# Patient Record
Sex: MALE | Race: WHITE | NOT HISPANIC OR LATINO | URBAN - METROPOLITAN AREA
[De-identification: names, ages, dates, MRNs, and addresses within clinical notes are randomized per-mention and may not be internally consistent; named-entity substitution may affect disease eponyms.]

---

## 2020-11-10 ENCOUNTER — OFFICE VISIT (OUTPATIENT)
Dept: INFECTIOUS DISEASES | Facility: MEDICAL CENTER | Age: 50
End: 2020-11-10
Payer: MEDICAID

## 2020-11-10 ENCOUNTER — HOSPITAL ENCOUNTER (OUTPATIENT)
Dept: LAB | Facility: MEDICAL CENTER | Age: 50
End: 2020-11-10
Attending: INTERNAL MEDICINE
Payer: MEDICAID

## 2020-11-10 VITALS
DIASTOLIC BLOOD PRESSURE: 60 MMHG | SYSTOLIC BLOOD PRESSURE: 90 MMHG | BODY MASS INDEX: 29.92 KG/M2 | RESPIRATION RATE: 16 BRPM | OXYGEN SATURATION: 95 % | WEIGHT: 202 LBS | HEIGHT: 69 IN | HEART RATE: 80 BPM | TEMPERATURE: 97.9 F

## 2020-11-10 DIAGNOSIS — B18.2 CHRONIC HEPATITIS C WITHOUT HEPATIC COMA (HCC): ICD-10-CM

## 2020-11-10 LAB
ALBUMIN SERPL BCP-MCNC: 4.7 G/DL (ref 3.2–4.9)
ALBUMIN/GLOB SERPL: 1.3 G/DL
ALP SERPL-CCNC: 87 U/L (ref 30–99)
ALT SERPL-CCNC: 215 U/L (ref 2–50)
ANION GAP SERPL CALC-SCNC: 12 MMOL/L (ref 7–16)
AST SERPL-CCNC: 95 U/L (ref 12–45)
BILIRUB SERPL-MCNC: 0.2 MG/DL (ref 0.1–1.5)
BUN SERPL-MCNC: 13 MG/DL (ref 8–22)
CALCIUM SERPL-MCNC: 10.6 MG/DL (ref 8.5–10.5)
CHLORIDE SERPL-SCNC: 102 MMOL/L (ref 96–112)
CO2 SERPL-SCNC: 27 MMOL/L (ref 20–33)
CREAT SERPL-MCNC: 0.72 MG/DL (ref 0.5–1.4)
GLOBULIN SER CALC-MCNC: 3.6 G/DL (ref 1.9–3.5)
GLUCOSE SERPL-MCNC: 86 MG/DL (ref 65–99)
HBV CORE AB SERPL QL IA: REACTIVE
HBV SURFACE AB SERPL IA-ACNC: 207 MIU/ML (ref 0–10)
HBV SURFACE AG SER QL: NORMAL
HIV 1+2 AB+HIV1 P24 AG SERPL QL IA: NORMAL
POTASSIUM SERPL-SCNC: 4.4 MMOL/L (ref 3.6–5.5)
PROT SERPL-MCNC: 8.3 G/DL (ref 6–8.2)
SODIUM SERPL-SCNC: 141 MMOL/L (ref 135–145)

## 2020-11-10 PROCEDURE — 87902 NFCT AGT GNTYP ALYS HEP C: CPT

## 2020-11-10 PROCEDURE — 86706 HEP B SURFACE ANTIBODY: CPT

## 2020-11-10 PROCEDURE — 86708 HEPATITIS A ANTIBODY: CPT

## 2020-11-10 PROCEDURE — 87389 HIV-1 AG W/HIV-1&-2 AB AG IA: CPT

## 2020-11-10 PROCEDURE — 36415 COLL VENOUS BLD VENIPUNCTURE: CPT

## 2020-11-10 PROCEDURE — 80053 COMPREHEN METABOLIC PANEL: CPT

## 2020-11-10 PROCEDURE — 87340 HEPATITIS B SURFACE AG IA: CPT

## 2020-11-10 PROCEDURE — 87522 HEPATITIS C REVRS TRNSCRPJ: CPT

## 2020-11-10 PROCEDURE — 86704 HEP B CORE ANTIBODY TOTAL: CPT

## 2020-11-10 PROCEDURE — 99203 OFFICE O/P NEW LOW 30 MIN: CPT | Performed by: INTERNAL MEDICINE

## 2020-11-10 RX ORDER — DICLOFENAC POTASSIUM 50 MG/1
50 TABLET, FILM COATED ORAL
COMMUNITY
Start: 2020-09-14 | End: 2021-10-13

## 2020-11-10 RX ORDER — IBUPROFEN 200 MG
200 TABLET ORAL EVERY 6 HOURS PRN
COMMUNITY

## 2020-11-10 NOTE — PROGRESS NOTES
Date of Service:  11/10/2020    Reason for Consult: Hepatitis C    Referring Physician: Mica Tapia MD    HPI:   The patient is a 50-year-old male with past medical history of acid reflux/GERD, IV drug use, chronic joint pain and tobacco use.  He states that he last used drugs of any kind over a year ago and continues as part of a recovery program.  He endorses smoking 1/2 pack of cigarettes per day.  He is unsure of when he was diagnosed with hepatitis C potentially before 2005.  He is never been treated for hepatitis C.  He now has a new primary care physician and as part of work-up and screening the hepatitis C was again noted and he was referred for therapy.  I have reviewed labs and hepatitis C NAAT is positive indicating chronic hepatitis C.     He is feeling well overall today.  He has some ongoing fatigue but also states he works 3 jobs.  He also has chronic joint pain particular his hands but states this is unchanged.  He denies any headaches or vision changes.  No cough or shortness of breath.  No abdominal pain, nausea or diarrhea.  No skin lesions or joint effusions.    ROS: All other ROS were reviewed and are negative except as noted above in the HPI.     No past medical history on file.   See HPI    No past surgical history on file.   See HPI    No pertinent family history    Social History     Socioeconomic History   • Marital status: Legally      Spouse name: Not on file   • Number of children: Not on file   • Years of education: Not on file   • Highest education level: Not on file   Occupational History   • Not on file   Social Needs   • Financial resource strain: Not on file   • Food insecurity     Worry: Not on file     Inability: Not on file   • Transportation needs     Medical: Not on file     Non-medical: Not on file   Tobacco Use   • Smoking status: Not on file   Substance and Sexual Activity   • Alcohol use: Not on file   • Drug use: Not on file   • Sexual activity: Not on file  "  Lifestyle   • Physical activity     Days per week: Not on file     Minutes per session: Not on file   • Stress: Not on file   Relationships   • Social connections     Talks on phone: Not on file     Gets together: Not on file     Attends Mandaeism service: Not on file     Active member of club or organization: Not on file     Attends meetings of clubs or organizations: Not on file     Relationship status: Not on file   • Intimate partner violence     Fear of current or ex partner: Not on file     Emotionally abused: Not on file     Physically abused: Not on file     Forced sexual activity: Not on file   Other Topics Concern   • Not on file   Social History Narrative   • Not on file     Social History     Tobacco Use   Smoking Status Not on file     Social History     Substance and Sexual Activity   Alcohol Use Not on file     No Known Allergies      Other Current Medications:  Diclofenac    History reviewed with the patient       Most Recent Vital Signs:  BP (!) 90/60 (BP Location: Left arm, Patient Position: Sitting, BP Cuff Size: Adult)   Pulse 80   Temp 36.6 °C (97.9 °F) (Temporal)   Resp 16   Ht 1.74 m (5' 8.5\") Comment: patient reported  Wt 91.6 kg (202 lb)   SpO2 95%   BMI 30.27 kg/m²   Physical Exam:  General: well-appearing, in no acute distress  HEENT: sclera anicteric, MMM, no lymphadenopathy, no oral lesions  Neck: supple, no LAD  Chest: CTAB, normal work of breathing, no rubs/rales/wheezing  Cardiac: RRR, normal S1 S2, no m/r/g   Abdomen: +bowel sounds, soft, non-tender, non-distended  Extremities: no edema, WWP  Skin: no rashes or worrisome lesions, tattoos  Neuro: Alert and oriented to person, place and time, non-focal, moves all extremities    Pertinent Lab Results:  Reviewed labs under media tab    Microbiology:    Studies:      Impression/Plan:     1. Chronic hepatitis C without hepatic coma (HCC)  HCV RNA BY PCR QN FLX WADE    HIV AG/AB COMBO ASSAY SCREENING    Comp Metabolic Panel    " Prothrombin Time    HEP B SURFACE ANTIGEN    HEP B CORE AB TOTAL    HEP B SURFACE AB    HEP A AB TOTAL    REFERRAL TO PHARMACOTHERAPY SERVICE         PLAN:       Chronic hepatitis C    --- Last labs obtained approximately 5 months ago.  Ordered repeat labs as noted above.   --- Once labs have returned including assessment for fibrosis then can assess whether ultrasound would be recommended.  If the patient has fibrosis/cirrhosis then will recommend ultrasound prior to initiating therapy to ensure no suspicious lesions or malignancy.   --- Placed referral for pharmacotherapy-as per agreement with pharmacy they will contact the patient for a visit and then prescribe appropriate hepatitis C therapeutic in conjunction with ID.  Discussed this with the patient and he is aware and agreeable.  --- Anticipate on 8-12-week course      Vaccines   All HCV patients should be vaccinated for or have immunity (see labs above) for HAV, HBV, Pneumonia (PCV13 -> at least 8 weeks -> PPSV23), Tdap, influenza     Follow-up  --- Recommend call or office visit with pharmacy and repeat labs  4 weeks from start of therapy - to assess for tolerance and/or side effects related to treatment.  If any concerning symptoms such as fever, jaundice, scleral icterus, abdominal pain, abdominal distention, nausea, vomiting or diarrhea or other then  obtain eGFR, creatinine, hepatic function panel (note: A 10x increase in ALT at any time should prompt immediate DC of therapy, if ALT rising then test q2 weeks) and notify [facility].  At any time during therapy if there are concerning signs/symptoms then obtain the above labs and notify [facility].  If NO concerns then continue treatment and no additional labs are needed until after treatment is complete.   --- 12 weeks after the last day of  therapy - obtain an Hepatitis C virus (HCV) quantitative, real-time PCR this will establish if the patient is cured   --- Any patient  with cirrhosis so will need liver US or other dedicated imaging every 6 months     Karen Flynn M.D.    11/10/2020

## 2020-11-11 DIAGNOSIS — B18.2 CHRONIC HEPATITIS C WITHOUT HEPATIC COMA (HCC): ICD-10-CM

## 2020-11-12 LAB
HAV AB SER QL IA: NEGATIVE
HCV RNA SERPL NAA+PROBE-ACNC: ABNORMAL K[IU]/ML
HCV RNA SERPL NAA+PROBE-LOG IU: 4.75 LOG IU/ML
HCV RNA SERPL QL NAA+PROBE: DETECTED

## 2020-11-14 LAB — HCV GENTYP SERPL NAA+PROBE: NORMAL

## 2020-12-03 DIAGNOSIS — B18.2 CHRONIC HEPATITIS C WITHOUT HEPATIC COMA (HCC): ICD-10-CM

## 2020-12-14 ENCOUNTER — TELEPHONE (OUTPATIENT)
Dept: VASCULAR LAB | Facility: MEDICAL CENTER | Age: 50
End: 2020-12-14

## 2020-12-14 NOTE — TELEPHONE ENCOUNTER
Attempted to reach pt regarding referral to hepatitis infectious disease. Phone rang with no VM set up, will attempt to reach at later time.          Jasmyn Seals, TimothyD

## 2021-01-25 ENCOUNTER — TELEPHONE (OUTPATIENT)
Dept: VASCULAR LAB | Facility: MEDICAL CENTER | Age: 51
End: 2021-01-25

## 2021-01-26 NOTE — TELEPHONE ENCOUNTER
Spoke to patient via telephone about setting up an appointment for Hep C treatment. Stated he was interested in setting up an appointment but was wondering if he could do a virtual visit since he lives over two hours away and would need to borrow a friend's vehicle. Reported I would call him back tomorrow to follow up if we could see in via virtual visit.

## 2021-01-28 ENCOUNTER — TELEPHONE (OUTPATIENT)
Dept: VASCULAR LAB | Facility: MEDICAL CENTER | Age: 51
End: 2021-01-28

## 2021-01-29 NOTE — TELEPHONE ENCOUNTER
Spoke to patient earlier this week and he was inquiring about potentially doing a Zoom/Virtual visit for his Hep C treatment. Called patient and left a voicemail to have him call me back to talk about his future appointments for Hep C treatment.

## 2021-02-23 DIAGNOSIS — B18.2 CHRONIC HEPATITIS C WITHOUT HEPATIC COMA (HCC): ICD-10-CM

## 2021-02-26 ENCOUNTER — NON-PROVIDER VISIT (OUTPATIENT)
Dept: VASCULAR LAB | Facility: MEDICAL CENTER | Age: 51
End: 2021-02-26
Attending: INTERNAL MEDICINE
Payer: MEDICAID

## 2021-02-26 DIAGNOSIS — B19.20 HEPATITIS C VIRUS INFECTION WITHOUT HEPATIC COMA, UNSPECIFIED CHRONICITY: ICD-10-CM

## 2021-02-26 PROCEDURE — 99213 OFFICE O/P EST LOW 20 MIN: CPT | Performed by: INTERNAL MEDICINE

## 2021-02-26 PROCEDURE — 99213 OFFICE O/P EST LOW 20 MIN: CPT

## 2021-02-26 RX ORDER — FAMOTIDINE 20 MG/1
20 TABLET, FILM COATED ORAL 2 TIMES DAILY
COMMUNITY
End: 2021-10-13

## 2021-02-26 NOTE — NON-PROVIDER
Hepatitis C Clinic Visit  Date of Service: 02/26/21    Primary care physician: Mica Tapia M.D  ID Physician: Dr. Gee M.D.    Reason for consult  Héctor Mckeon is a 50 y.o. old patient who comes in today for the overview and treatment of Hepatitis C. Patient still has to schedule an ultrasound before treatment can start. Patient reports no complaints today. Patient stated he has done his research regarding what hepatitis C is and the complications.     Past Medical History:  There are no problems to display for this patient.    Past Surgical History:  No past surgical history on file.    Allergies:  Patient has no known allergies.    SOCIAL HISTORY  -Patient reports he does not drink or does any type of recreational drugs  -Reports that he has been sober  -Reports he smokes 0.5 pack of cigarettes/day     Medications:    Current Outpatient Medications:   •  diclofenac (CATAFLAM) 50 MG tablet, 50 mg., Disp: , Rfl:   •  ibuprofen (MOTRIN) 200 MG Tab, Take 200 mg by mouth every 6 hours as needed., Disp: , Rfl:     -Patient reports he is no longer taking the diclofenac and he takes famotidine for his GERD.     Other Pertinent Blood Work:   Lab Results   Component Value Date    SODIUM 141 11/10/2020    POTASSIUM 4.4 11/10/2020    CHLORIDE 102 11/10/2020    CO2 27 11/10/2020    ANION 12.0 11/10/2020    GLUCOSE 86 11/10/2020    BUN 13 11/10/2020    CREATININE 0.72 11/10/2020    CALCIUM 10.6 (H) 11/10/2020    ASTSGOT 95 (H) 11/10/2020    ALTSGPT 215 (H) 11/10/2020    ALKPHOSPHAT 87 11/10/2020    TBILIRUBIN 0.2 11/10/2020    ALBUMIN 4.7 11/10/2020    AGRATIO 1.3 11/10/2020       HCV medication checked for DDI with current medication  N/A, not started on treatment yet. Will have to consider his famotidine use in Hep C selection after his imaging results come back.     Hepatitis Vaccines Received  Hep A: N/A  Hep B: Stated that he had already his Hep B vaccine    Patient was very adamant about not getting  vaccines. Stated he does not like vaccines d/t a bad reaction when he was 19 y/o and ended up in the hospital. Stated he got a really severe case of the flu that made him go to the hospital. Stated to the patient we will revisit this topic during his next appointment.       ASSESSMENT AND PLAN    1. Hepatitis C     Counseled the patient about hepatitis C, complications, side effects, prevention, and treatment. Went over lifestyle modifications (tobacco, dietary, exercise, etc) with the patient and some of the potential side effects he may see with treatment. Recommended to the patient to limit his tobacco use. Went over the process of getting the medication approved through his insurance and what labs will need to be done throughout and at the end of treatment.     Patient is to schedule his ultrasound appointment at [facility]. Orders were faxed for him to set up an appointment. Once completed and medication is started, will then order a CBC and CMP for the patient after 4 weeks of treatment and to schedule a follow up appointment.     Blood Work Ordered At Today’s visit:   N/A  Follow-Up:   Ultrasound to be completed before treatment starts     Daphne Madison, Lj    CC:  DAYRON Ko Natalie L., M.D  Patient Consult Note

## 2021-03-14 ENCOUNTER — TELEPHONE (OUTPATIENT)
Dept: PHARMACY | Facility: MEDICAL CENTER | Age: 51
End: 2021-03-14

## 2021-03-14 NOTE — TELEPHONE ENCOUNTER
Patient left a voicemail regarding the start of his Hepatitis C treatment. Called patient today to ask if he got his ultrasound done. Reported that got it done on 3/12/21. Reported that I will call the hospital to have them fax the results over to Dr. Flynn to look at and will likely be able to start the PA process for his Hep C medication sometime later this week.     Called [facility] (T: [phone]) to get his US report faxed over, however, the medical records office is not open on the weekend. Will follow up this week for results to be faxed over.

## 2021-03-26 ENCOUNTER — TELEPHONE (OUTPATIENT)
Dept: VASCULAR LAB | Facility: MEDICAL CENTER | Age: 51
End: 2021-03-26

## 2021-03-26 NOTE — TELEPHONE ENCOUNTER
Called patient to inform him that we are able to start treatment for his hepatitis C with Mavyret. Wanted to clarify patient's insurance, pharmacy, and counseling points about the medication but was unable to reach the patient. Left a voicemail for the patient to call me back.

## 2021-03-29 DIAGNOSIS — B19.20 HEPATITIS C VIRUS INFECTION WITHOUT HEPATIC COMA, UNSPECIFIED CHRONICITY: ICD-10-CM

## 2021-03-29 RX ORDER — GLECAPREVIR AND PIBRENTASVIR 40; 100 MG/1; MG/1
3 TABLET, FILM COATED ORAL DAILY
Qty: 84 TABLET | Refills: 1 | Status: SHIPPED | OUTPATIENT
Start: 2021-04-05 | End: 2021-10-13

## 2021-04-05 PROCEDURE — RXMED WILLOW AMBULATORY MEDICATION CHARGE: Performed by: INTERNAL MEDICINE

## 2021-04-07 ENCOUNTER — TELEPHONE (OUTPATIENT)
Dept: VASCULAR LAB | Facility: MEDICAL CENTER | Age: 51
End: 2021-04-07

## 2021-04-08 NOTE — TELEPHONE ENCOUNTER
Called [facility] to see the progress of this patient's PA for their Hep C medication. Reported that it has been approved and it is ready to send out. Reported they just need to speak with the patient before mailing it out.    Called patient to update him on the status of his medication. Was unable to speak to him and left him a voicemail to Call [facility] for his medication to be sent out to him.

## 2021-04-12 ENCOUNTER — PHARMACY VISIT (OUTPATIENT)
Dept: PHARMACY | Facility: MEDICAL CENTER | Age: 51
End: 2021-04-12
Payer: COMMERCIAL

## 2021-04-14 ENCOUNTER — TELEPHONE (OUTPATIENT)
Dept: VASCULAR LAB | Facility: MEDICAL CENTER | Age: 51
End: 2021-04-14

## 2021-04-15 NOTE — TELEPHONE ENCOUNTER
Called patient to see if he received and started his hepatitis C medication. Reported that he got it delivered and started taking in on 4/13. Counseled the patient about how to properly take Mavyret and the potential side effects it may have. Will have to schedule a follow up in 4 weeks (~5/11) for the patient to get a CBC done.

## 2021-05-07 DIAGNOSIS — B18.2 CHRONIC HEPATITIS C WITHOUT HEPATIC COMA (HCC): ICD-10-CM

## 2021-05-11 PROCEDURE — RXMED WILLOW AMBULATORY MEDICATION CHARGE: Performed by: INTERNAL MEDICINE

## 2021-05-12 ENCOUNTER — PHARMACY VISIT (OUTPATIENT)
Dept: PHARMACY | Facility: MEDICAL CENTER | Age: 51
End: 2021-05-12
Payer: COMMERCIAL

## 2021-06-14 ENCOUNTER — TELEPHONE (OUTPATIENT)
Dept: VASCULAR LAB | Facility: MEDICAL CENTER | Age: 51
End: 2021-06-14

## 2021-06-14 NOTE — TELEPHONE ENCOUNTER
Patient left a voicemail stating that he is almost at the end of treatment and wanted to know the next steps. Called patient back but was unable to speak with him and left him a voicemail. Will need to clarify with patient what date he ended treatment and then will have to schedule his final appointment and PCR testing 12 weeks after he completed treatment. Awaiting call back from patient.

## 2021-07-02 ENCOUNTER — TELEPHONE (OUTPATIENT)
Dept: VASCULAR LAB | Facility: MEDICAL CENTER | Age: 51
End: 2021-07-02

## 2021-07-02 NOTE — TELEPHONE ENCOUNTER
Was able to speak with the patient. Reported he took his last dose around 6/10. Since the patient lives two hours away, he requested if he could get his blood work done again at [facility] (T:[phone]). Stated to him that we will get [facility] to send an order over for his HCV PCR when the time gets closer. Stated that we would call him about a week prior to remind him to get his blood work done.     Will have new PharmD call the patient the week of September 6 to remind patient to get his labs done after 9/13. PharmD will need to reach out to [facility] to request them to fax the orders over. Will need to follow up with OSH to fax over results to [facility] office & PharmD. Once labs are completed will need to notify [facility] to schedule a final appointment with the patient.

## 2021-09-09 ENCOUNTER — TELEPHONE (OUTPATIENT)
Dept: VASCULAR LAB | Facility: MEDICAL CENTER | Age: 51
End: 2021-09-09

## 2021-09-09 DIAGNOSIS — B19.20 HEPATITIS C VIRUS INFECTION WITHOUT HEPATIC COMA, UNSPECIFIED CHRONICITY: ICD-10-CM

## 2021-09-09 NOTE — TELEPHONE ENCOUNTER
Called patient to inform him that he can have his final HCV NAAT lab drawn to test for HepC cure, given it has been >12 weeks since the patient finished treatment. Patient requested labs be faxed to [facility] because it is closer to him than [facility].    Orders placed for HCV by Quantitative NAAT. Forwarded chart to [facility] medical assistant [place] to fax lab orders over to [facility] [place] lab (fax [phone]) so patient can have labs drawn there. Once labwork is resulted, will contact [facility] to fax back lab results to [facility] ID and ID pharmacy.    Ibis Valencia, PharmD  PGY2 Infectious Diseases Pharmacy Resident

## 2021-09-14 ENCOUNTER — TELEPHONE (OUTPATIENT)
Dept: VASCULAR LAB | Facility: MEDICAL CENTER | Age: 51
End: 2021-09-14

## 2021-09-14 NOTE — TELEPHONE ENCOUNTER
Received call from patient today stating that he had his labs drawn at [facility] yesterday. Informed patient I will reach out to lab to have them fax the results to us at [phone] (Attn: Ibis) when complete.     Ibis Valencia, PharmD  PGY2 Infectious Diseases Pharmacy Resident

## 2021-10-13 ENCOUNTER — OFFICE VISIT (OUTPATIENT)
Dept: INFECTIOUS DISEASES | Facility: MEDICAL CENTER | Age: 51
End: 2021-10-13
Payer: MEDICAID

## 2021-10-13 ENCOUNTER — TELEPHONE (OUTPATIENT)
Dept: INFECTIOUS DISEASES | Facility: MEDICAL CENTER | Age: 51
End: 2021-10-13

## 2021-10-13 VITALS
TEMPERATURE: 97.8 F | DIASTOLIC BLOOD PRESSURE: 60 MMHG | OXYGEN SATURATION: 92 % | HEIGHT: 68 IN | HEART RATE: 65 BPM | RESPIRATION RATE: 16 BRPM | SYSTOLIC BLOOD PRESSURE: 102 MMHG | BODY MASS INDEX: 31.98 KG/M2 | WEIGHT: 211 LBS

## 2021-10-13 DIAGNOSIS — B18.2 CHRONIC HEPATITIS C WITHOUT HEPATIC COMA (HCC): ICD-10-CM

## 2021-10-13 PROCEDURE — 99213 OFFICE O/P EST LOW 20 MIN: CPT | Performed by: INTERNAL MEDICINE

## 2021-10-13 NOTE — TELEPHONE ENCOUNTER
----- Message from Francisco J Reynolds M.D. sent at 10/13/2021  3:22 PM PDT -----  Skyler Rand,    I forgot to mention to Mr. Mckeon that his labs showed that he was immune to hepatitis B, but not to hepatitis A. When he sees his PCP next, he can request a hepatitis A vaccination series.

## 2021-10-13 NOTE — PROGRESS NOTES
[facility] FOLLOW-UP NOTE     Date of Service: 10/13/2021    Chief Complaint: Follow-up after completion of hepatitis C treatment    History of Present Illness:     Héctor Mckeon is a 51 y.o. male patient with past medical history of acid reflux/GERD, IV drug use, chronic joint pain and tobacco use.  Patient was screened positive for chronic hepatitis C, and was referred to ID clinic, seen in November 2020.  At the time, patient was sober for over a year, and in a drug recovery program.  Patient remains sober now, and has not used drugs or alcohol for over 2 years.    Patient completed a course of Mavyret and repeat hepatitis C testing more than 12 weeks after completion of therapy show undetectable quantitative PCR.  Patient states some improved energy.  He did have some headaches at the start of Mavyret course, but those resolved spontaneously.  He has been continuing to work, no new issues.    Review of Systems:  All other systems reviewed and are negative expect as noted in HPI    Past Medical History:   Diagnosis Date   • Hep C w/o coma, chronic (HCC)        History reviewed. No pertinent surgical history.    History reviewed. No pertinent family history.    Social History     Socioeconomic History   • Marital status: Legally      Spouse name: Not on file   • Number of children: Not on file   • Years of education: Not on file   • Highest education level: Not on file   Occupational History   • Not on file   Tobacco Use   • Smoking status: Not on file   Substance and Sexual Activity   • Alcohol use: Not on file   • Drug use: Not on file   • Sexual activity: Not on file   Other Topics Concern   • Not on file   Social History Narrative   • Not on file     Social Determinants of Health     Financial Resource Strain:    • Difficulty of Paying Living Expenses:    Food Insecurity:    • Worried About Running Out of Food in the Last Year:    • Ran Out of Food in the Last Year:  "   Transportation Needs:    • Lack of Transportation (Medical):    • Lack of Transportation (Non-Medical):    Physical Activity:    • Days of Exercise per Week:    • Minutes of Exercise per Session:    Stress:    • Feeling of Stress :    Social Connections:    • Frequency of Communication with Friends and Family:    • Frequency of Social Gatherings with Friends and Family:    • Attends Christianity Services:    • Active Member of Clubs or Organizations:    • Attends Club or Organization Meetings:    • Marital Status:    Intimate Partner Violence:    • Fear of Current or Ex-Partner:    • Emotionally Abused:    • Physically Abused:    • Sexually Abused:        No Known Allergies    Medications:  Current Outpatient Medications on File Prior to Visit   Medication Sig Dispense Refill   • ibuprofen (MOTRIN) 200 MG Tab Take 200 mg by mouth every 6 hours as needed.     • Glecaprevir-Pibrentasvir (MAVYRET) 100-40 MG Tab tablet Take 3 Tablets by mouth every day at 6 PM. Please take with food (Patient not taking: Reported on 10/13/2021) 84 tablet 1     No current facility-administered medications on file prior to visit.       Physical Exam:   Vital Signs: /60 (BP Location: Left arm, Patient Position: Sitting, BP Cuff Size: Large adult)   Pulse 65   Temp 36.6 °C (97.8 °F) (Temporal)   Resp 16   Ht 1.727 m (5' 8\")   Wt 95.7 kg (211 lb)   SpO2 92%   BMI 32.08 kg/m²   Vital signs reviewed    Constitutional: Patient is oriented to person, place, and time. Appears well-developed and well-nourished. No distress  Head: Atraumatic, normocephalic  Eyes: Conjunctivae normal, EOM intact. Pupils are equal, round, and reactive to light.   Mouth/Throat: Lips without lesions, good dentition, oropharynx is clear and moist.  Neck: Neck supple. No masses/lymphadenopathy  Cardiovascular: Normal rate, regular rhythm, normal S1S2 and intact distal pulses. No murmur, gallop, or friction rub. No pedal edema.  Pulmonary/Chest: No respiratory " distress. Unlabored respiratory effort, lungs clear to auscultation. No wheezes or rales.   Abdominal: Soft, non tender. BS + x 4. No masses or hepatosplenomegaly.   Musculoskeletal: No joint tenderness, swelling, erythema, or restriction of motion noted.  Neurological: Alert and oriented to person, place, and time. No gross cranial nerve deficit. No focal neural deficit noted  Skin: Skin is warm and dry. No rashes or embolic phenomena noted on exposed skin  Psychiatric: Normal mood and affect. Behavior is normal.     LABS:  No results found for: WBC, RBC, HEMOGLOBIN, HEMATOCRIT, MCV, MCH, MCHC, MPV  Sodium   Date/Time Value Ref Range Status   11/10/2020 10:23  135 - 145 mmol/L Final     Potassium   Date/Time Value Ref Range Status   11/10/2020 10:23 AM 4.4 3.6 - 5.5 mmol/L Final     Chloride   Date/Time Value Ref Range Status   11/10/2020 10:23  96 - 112 mmol/L Final     Co2   Date/Time Value Ref Range Status   11/10/2020 10:23 AM 27 20 - 33 mmol/L Final     Glucose   Date/Time Value Ref Range Status   11/10/2020 10:23 AM 86 65 - 99 mg/dL Final     Bun   Date/Time Value Ref Range Status   11/10/2020 10:23 AM 13 8 - 22 mg/dL Final     Creatinine   Date/Time Value Ref Range Status   11/10/2020 10:23 AM 0.72 0.50 - 1.40 mg/dL Final     Alkaline Phosphatase   Date/Time Value Ref Range Status   11/10/2020 10:23 AM 87 30 - 99 U/L Final     AST(SGOT)   Date/Time Value Ref Range Status   11/10/2020 10:23 AM 95 (H) 12 - 45 U/L Final     ALT(SGPT)   Date/Time Value Ref Range Status   11/10/2020 10:23  (H) 2 - 50 U/L Final     Total Bilirubin   Date/Time Value Ref Range Status   11/10/2020 10:23 AM 0.2 0.1 - 1.5 mg/dL Final      No results found for: CPKTOTAL     MICRO:  No results found for: BLOODCULTU, BLDCULT, BCHOLD      Latest pertinent labs were reviewed    IMAGING STUDIES:  Liver ultrasound with no masses    Assessment:   Héctor Mckeon is a 51 y.o. male with chronic hepatitis C, completed  treatment with Mavyret and achieved SVR 12    Plan:   -Patient is now considered cured of hepatitis C.  Counseled regarding risk for reinfection, persistent positive antibody testing, and hepatic protection given F2 fibrosis  -Routine testing also revealed lack of immunity to hepatitis A.  Recommend vaccination series    ID clinic follow-up as needed    Francisco J Reynolds M.D.    Please note that this dictation was created using voice recognition software. I have worked with technical experts from formerly [facility] to optimize the interface.  I have made every reasonable attempt to correct obvious errors, but there may be errors of grammar and possibly content that I did not discover before finalizing the note.